# Patient Record
Sex: MALE | URBAN - METROPOLITAN AREA
[De-identification: names, ages, dates, MRNs, and addresses within clinical notes are randomized per-mention and may not be internally consistent; named-entity substitution may affect disease eponyms.]

---

## 2021-08-24 ENCOUNTER — OFFICE VISIT (OUTPATIENT)
Dept: URBAN - METROPOLITAN AREA CLINIC 72 | Facility: CLINIC | Age: 86
End: 2021-08-24

## 2021-08-24 NOTE — HPI-TODAY'S VISIT:
Mr. Rai is an 89-year-old male who presents as a new patient for evaluation of anemia.  Had blood work done 8/7/2021 and found to have a white count 12.4, hgb 8.9, MCV 87, platelet count 222, creatinine was noted to be 7.5